# Patient Record
Sex: MALE | Race: WHITE | NOT HISPANIC OR LATINO | Employment: FULL TIME | URBAN - METROPOLITAN AREA
[De-identification: names, ages, dates, MRNs, and addresses within clinical notes are randomized per-mention and may not be internally consistent; named-entity substitution may affect disease eponyms.]

---

## 2023-03-16 LAB
AMPHETAMINE (PRESENCE) IN URINE BY SCREEN METHOD: ABNORMAL
BARBITURATES PRESENCE IN URINE BY SCREEN METHOD: ABNORMAL
BENZODIAZEPINE (PRESENCE) IN URINE BY SCREEN METHOD: ABNORMAL
CANNABINOIDS IN URINE BY SCREEN METHOD: ABNORMAL
COCAINE (PRESENCE) IN URINE BY SCREEN METHOD: ABNORMAL
DRUG SCREEN COMMENT URINE: ABNORMAL
FENTANYL URINE: ABNORMAL
METHADONE (PRESENCE) IN URINE BY SCREEN METHOD: ABNORMAL
OPIATES (PRESENCE) IN URINE BY SCREEN METHOD: ABNORMAL
OXYCODONE (PRESENCE) IN URINE BY SCREEN METHOD: ABNORMAL
PHENCYCLIDINE (PRESENCE) IN URINE BY SCREEN METHOD: ABNORMAL

## 2023-03-18 LAB — PREGABALIN,URINE: 21.5 UG/ML

## 2023-03-20 LAB
6-ACETYLMORPHINE: <25 NG/ML
7-AMINOCLONAZEPAM: <25 NG/ML
ALPHA-HYDROXYALPRAZOLAM: <25 NG/ML
ALPHA-HYDROXYMIDAZOLAM: <25 NG/ML
ALPRAZOLAM: <25 NG/ML
AMPHETAMINE (PRESENCE) IN URINE BY SCREEN METHOD: ABNORMAL
BARBITURATES PRESENCE IN URINE BY SCREEN METHOD: ABNORMAL
CANNABINOIDS IN URINE BY SCREEN METHOD: ABNORMAL
CHLORDIAZEPOXIDE: <25 NG/ML
CLONAZEPAM: <25 NG/ML
COCAINE (PRESENCE) IN URINE BY SCREEN METHOD: ABNORMAL
CODEINE: <50 NG/ML
CREATINE, URINE FOR DRUG: 23.4 MG/DL
DIAZEPAM: <25 NG/ML
DRUG SCREEN COMMENT URINE: ABNORMAL
EDDP: <25 NG/ML
FENTANYL CONFIRMATION, URINE: <2.5 NG/ML
HYDROCODONE: <25 NG/ML
HYDROMORPHONE: <25 NG/ML
LORAZEPAM: <25 NG/ML
METHADONE CONFIRMATION,URINE: <25 NG/ML
MIDAZOLAM: <25 NG/ML
MORPHINE URINE: 413 NG/ML
NORDIAZEPAM: <25 NG/ML
NORFENTANYL: <2.5 NG/ML
NORHYDROCODONE: <25 NG/ML
NOROXYCODONE: <25 NG/ML
O-DESMETHYLTRAMADOL: <50 NG/ML
OXAZEPAM: <25 NG/ML
OXYCODONE: <25 NG/ML
OXYMORPHONE: <25 NG/ML
PHENCYCLIDINE (PRESENCE) IN URINE BY SCREEN METHOD: ABNORMAL
TEMAZEPAM: <25 NG/ML
TRAMADOL: <50 NG/ML
ZOLPIDEM METABOLITE (ZCA): <25 NG/ML
ZOLPIDEM: <25 NG/ML

## 2023-10-18 DIAGNOSIS — G89.4 CHRONIC PAIN SYNDROME: Primary | ICD-10-CM

## 2023-10-18 RX ORDER — MORPHINE SULFATE 15 MG/1
15 TABLET ORAL ONCE
COMMUNITY
End: 2023-10-18 | Stop reason: SDUPTHER

## 2023-10-18 RX ORDER — PREGABALIN 150 MG/1
1 CAPSULE ORAL 2 TIMES DAILY
COMMUNITY
Start: 2015-04-13 | End: 2023-10-18 | Stop reason: SDUPTHER

## 2023-10-20 RX ORDER — MORPHINE SULFATE 15 MG/1
15 TABLET ORAL ONCE AS NEEDED
Qty: 20 TABLET | Refills: 0 | Status: SHIPPED | OUTPATIENT
Start: 2023-10-20 | End: 2023-11-21 | Stop reason: SDUPTHER

## 2023-10-20 RX ORDER — PREGABALIN 150 MG/1
150 CAPSULE ORAL 2 TIMES DAILY
Qty: 60 CAPSULE | Refills: 5 | Status: SHIPPED | OUTPATIENT
Start: 2023-10-20 | End: 2023-11-19

## 2023-11-21 DIAGNOSIS — G89.4 CHRONIC PAIN SYNDROME: ICD-10-CM

## 2023-11-22 RX ORDER — MORPHINE SULFATE 15 MG/1
15 TABLET ORAL ONCE AS NEEDED
Qty: 20 TABLET | Refills: 0 | Status: SHIPPED | OUTPATIENT
Start: 2023-11-22 | End: 2023-12-27 | Stop reason: SDUPTHER

## 2023-12-27 DIAGNOSIS — G89.4 CHRONIC PAIN SYNDROME: ICD-10-CM

## 2023-12-27 PROBLEM — S42.92XA FRACTURE OF LEFT SHOULDER: Status: ACTIVE | Noted: 2023-12-27

## 2023-12-27 PROBLEM — M25.562 LEFT KNEE PAIN: Status: ACTIVE | Noted: 2023-12-27

## 2023-12-27 PROBLEM — E11.9 DIABETES MELLITUS (MULTI): Status: ACTIVE | Noted: 2023-12-27

## 2023-12-27 PROBLEM — M62.838 MUSCLE SPASM OF LEFT SHOULDER: Status: ACTIVE | Noted: 2023-12-27

## 2023-12-27 PROBLEM — S90.01XA CONTUSION OF RIGHT ANKLE: Status: ACTIVE | Noted: 2023-12-27

## 2023-12-27 PROBLEM — K40.90 LEFT INGUINAL HERNIA: Status: ACTIVE | Noted: 2023-12-27

## 2023-12-27 PROBLEM — M17.12 OSTEOARTHRITIS OF LEFT KNEE: Status: ACTIVE | Noted: 2023-12-27

## 2023-12-27 PROBLEM — S90.31XA CONTUSION OF FOOT, RIGHT: Status: ACTIVE | Noted: 2023-12-27

## 2023-12-27 PROBLEM — J31.0 RHINITIS, CHRONIC: Status: ACTIVE | Noted: 2023-12-27

## 2023-12-27 PROBLEM — R14.0 ABDOMINAL BLOATING: Status: ACTIVE | Noted: 2023-12-27

## 2023-12-27 PROBLEM — G90.521 COMPLEX REGIONAL PAIN SYNDROME TYPE 1 OF RIGHT LOWER EXTREMITY: Status: ACTIVE | Noted: 2023-12-27

## 2023-12-27 RX ORDER — INSULIN GLARGINE 300 U/ML
INJECTION, SOLUTION SUBCUTANEOUS
COMMUNITY
Start: 2021-10-15

## 2023-12-27 RX ORDER — PENICILLIN V POTASSIUM 500 MG/1
TABLET, FILM COATED ORAL
COMMUNITY
Start: 2023-11-17 | End: 2024-02-21 | Stop reason: ALTCHOICE

## 2023-12-27 RX ORDER — ATORVASTATIN CALCIUM 20 MG/1
20 TABLET, FILM COATED ORAL
COMMUNITY
Start: 2023-08-09

## 2023-12-27 RX ORDER — INSULIN ASPART INJECTION 100 [IU]/ML
INJECTION, SOLUTION SUBCUTANEOUS
COMMUNITY
Start: 2019-04-11

## 2023-12-27 RX ORDER — CHOLECALCIFEROL (VITAMIN D3) 25 MCG
1 TABLET ORAL DAILY
COMMUNITY
Start: 2021-06-11

## 2023-12-27 RX ORDER — FLUTICASONE PROPIONATE 93 UG/1
SPRAY, METERED NASAL
COMMUNITY
Start: 2023-04-18

## 2023-12-27 RX ORDER — INSULIN GLARGINE 100 [IU]/ML
INJECTION, SOLUTION SUBCUTANEOUS
COMMUNITY
Start: 2021-10-15

## 2023-12-27 RX ORDER — IBUPROFEN 200 MG
TABLET ORAL
COMMUNITY
Start: 2023-05-10

## 2023-12-27 RX ORDER — DULOXETIN HYDROCHLORIDE 60 MG/1
1 CAPSULE, DELAYED RELEASE ORAL NIGHTLY
COMMUNITY
Start: 2017-03-13

## 2023-12-27 RX ORDER — GLUCAGON 3 MG/1
POWDER NASAL
COMMUNITY
Start: 2021-07-12

## 2023-12-27 RX ORDER — INSULIN GLARGINE 100 [IU]/ML
INJECTION, SOLUTION SUBCUTANEOUS
COMMUNITY
Start: 2019-04-11

## 2023-12-27 RX ORDER — NALOXONE HYDROCHLORIDE 4 MG/.1ML
SPRAY NASAL
COMMUNITY
Start: 2021-09-08

## 2023-12-27 RX ORDER — ESZOPICLONE 3 MG/1
1 TABLET, FILM COATED ORAL NIGHTLY
COMMUNITY
Start: 2014-06-11 | End: 2024-02-21 | Stop reason: ALTCHOICE

## 2023-12-27 RX ORDER — IBUPROFEN 600 MG/1
TABLET ORAL
COMMUNITY
Start: 2023-11-17

## 2023-12-27 RX ORDER — AZELASTINE HCL 205.5 UG/1
SPRAY NASAL
COMMUNITY
Start: 2020-08-20

## 2023-12-27 RX ORDER — FLASH GLUCOSE SENSOR
KIT MISCELLANEOUS
COMMUNITY
Start: 2023-09-26

## 2023-12-27 RX ORDER — MORPHINE SULFATE 15 MG/1
15 TABLET ORAL ONCE AS NEEDED
Qty: 20 TABLET | Refills: 0 | Status: SHIPPED | OUTPATIENT
Start: 2023-12-27 | End: 2024-01-29 | Stop reason: SDUPTHER

## 2024-01-08 ENCOUNTER — APPOINTMENT (OUTPATIENT)
Dept: PAIN MEDICINE | Facility: CLINIC | Age: 54
End: 2024-01-08
Payer: COMMERCIAL

## 2024-01-29 DIAGNOSIS — G89.4 CHRONIC PAIN SYNDROME: ICD-10-CM

## 2024-01-30 RX ORDER — MORPHINE SULFATE 15 MG/1
15 TABLET ORAL ONCE AS NEEDED
Qty: 20 TABLET | Refills: 0 | Status: SHIPPED | OUTPATIENT
Start: 2024-01-30 | End: 2024-02-21 | Stop reason: SDUPTHER

## 2024-02-21 ENCOUNTER — OFFICE VISIT (OUTPATIENT)
Dept: PAIN MEDICINE | Facility: CLINIC | Age: 54
End: 2024-02-21
Payer: COMMERCIAL

## 2024-02-21 VITALS
WEIGHT: 233 LBS | HEIGHT: 75 IN | RESPIRATION RATE: 15 BRPM | BODY MASS INDEX: 28.97 KG/M2 | DIASTOLIC BLOOD PRESSURE: 95 MMHG | HEART RATE: 75 BPM | SYSTOLIC BLOOD PRESSURE: 140 MMHG | TEMPERATURE: 96.6 F

## 2024-02-21 DIAGNOSIS — G89.4 CHRONIC PAIN SYNDROME: ICD-10-CM

## 2024-02-21 PROCEDURE — 99214 OFFICE O/P EST MOD 30 MIN: CPT | Performed by: ANESTHESIOLOGY

## 2024-02-21 RX ORDER — MORPHINE SULFATE 15 MG/1
15 TABLET ORAL ONCE AS NEEDED
Qty: 20 TABLET | Refills: 0 | Status: SHIPPED | OUTPATIENT
Start: 2024-03-04 | End: 2024-04-04 | Stop reason: SDUPTHER

## 2024-02-21 ASSESSMENT — COLUMBIA-SUICIDE SEVERITY RATING SCALE - C-SSRS
6. HAVE YOU EVER DONE ANYTHING, STARTED TO DO ANYTHING, OR PREPARED TO DO ANYTHING TO END YOUR LIFE?: NO
2. HAVE YOU ACTUALLY HAD ANY THOUGHTS OF KILLING YOURSELF?: NO
1. IN THE PAST MONTH, HAVE YOU WISHED YOU WERE DEAD OR WISHED YOU COULD GO TO SLEEP AND NOT WAKE UP?: NO

## 2024-02-21 ASSESSMENT — ENCOUNTER SYMPTOMS
LOSS OF SENSATION IN FEET: 0
OCCASIONAL FEELINGS OF UNSTEADINESS: 0
DEPRESSION: 0

## 2024-02-21 ASSESSMENT — PATIENT HEALTH QUESTIONNAIRE - PHQ9
2. FEELING DOWN, DEPRESSED OR HOPELESS: NOT AT ALL
1. LITTLE INTEREST OR PLEASURE IN DOING THINGS: NOT AT ALL
SUM OF ALL RESPONSES TO PHQ9 QUESTIONS 1 AND 2: 0

## 2024-02-21 ASSESSMENT — LIFESTYLE VARIABLES: TOTAL SCORE: 0

## 2024-02-21 ASSESSMENT — PAIN SCALES - GENERAL: PAINLEVEL: 8

## 2024-02-21 NOTE — PROGRESS NOTES
History Of Present Illness  Benito Hare is a 53 y.o. male presenting with   Chief Complaint   Patient presents with    Pain     Patient follows up for his chronic right foot pain FROM his work related injury he is on [facility]. He reports he injured his right shoulder after a near fall approx 1 year ago and was dx with rotator cuff repair.      He also reports pain into his LEFT knee continues to bother him and he has an appt with [facility].     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 The pt underwent over 12 lumbar sympathetic blocks to treat his right foot pain. The pain is constant, worse with activity and better with rest. The pain is an aching like sensation that can intensify with activity. Denies LE paresthesias, weakness, saddle anesthesia, bowel or bladder incontinence. To manage this pain the patient has attempted Lyrica 150mg BID. The patients chronic IDDM are stable.      Recall:   He had an episode of hypoglycemia on 9-1-17 and passed out while on the couch. He did not have any head or shoulder trauma. EMS came to the house and tried to remove his jacket and in the process he broke his left shoulder and now s/p shoulder surgery.      Pt reports right knee pain for several months. He ambulates with a noticeable limp due to CRPS in his right foot. I believe that he may have knee OA or ligament injury and will order an x-ray to confirm OA. I believe this injury is a direct result of his [facility] injury of CRPS in the right foot.      PAIN SCORE: 4/10 in his foot and 8/10 in his left knee     PSHx     Pt is s/p Right sided hernia repair on July 7th, 2021.   He is s/p nasal surgery on 5-6-2021 that was complicated by post operative bleeding on 5- and had a cauterization surgery on 5-. He reports his nose is sensitive to touch and cold / dry air. His ENT is Dr. Nabil Spencer     Past Medical  History  He has a past medical history of Chronic pain syndrome (05/08/2017), Complex regional pain syndrome i of unspecified lower limb (05/08/2017), Contusion of unspecified ankle, initial encounter, Contusion of unspecified foot, initial encounter (05/08/2017), Other long term (current) drug therapy (10/06/2016), Other specified health status, and Rotator cuff arthropathy, right.    Surgical History  He has a past surgical history that includes Other surgical history (05/05/2016); Other surgical history (09/28/2021); Other surgical history (09/28/2021); and Shoulder surgery (01/17/2018).     Social History  He reports that he has never smoked. He has never used smokeless tobacco. He reports current alcohol use. He reports that he does not use drugs.    Family History  No family history on file.     Allergies  Patient has no known allergies.    Review of Systems    All other systems reviewed and negative for any deficits. Pertinent positives and negatives were considered in the medical decision making process.        Physical Exam  BP (!) 140/95   Pulse 75   Temp 35.9 °C (96.6 °F)   Resp 15   Wt 106 kg (233 lb)     General: Pt appears stated age     Eyes: Conjunctiva non-icteric and lids without obvious rash or drooping. Pupils are symmetric     ENT: External ears and nose appear to be without deformity or rash. No lesions or masses noted. Hearing is grossly intact     Neck: No JVD noted, tracheal position midline.     Skin: Color change red and blue in his right foot with edema and changes in the nail beds.      Musculoskeletal: Gait is antalgic. Walks on his right heel      Digits/nails show no clubbing or cyanosis     Exam of muscles/joints/bones shows no gross atrophy and no abnormal/involuntary movements in the head/neckNo asymmetry or masses noted in the head/neck     Stability: no subluxation noted on movement of bilateral upper extremities or head/neck     Strength: 4/5 in B/L Lower extremities       Range of Motion: Decreased in right ankle and decreased in left shoulder      Sensation: In tact      Cranial nerves 2-12 are grossly intact     Psychiatric: Pt is alert and oriented to time, place and person.      Assessment/Plan   No diagnosis found.     [facility]     S90.31XA  S90.01XA  G90.521        1. We once again did discuss the risks, benefits, and alternatives to chronic opioid therapy and that usage can be a danger to life. We also discussed proper and safe storage of medication to prevent unauthorized usage. The patient understands and will use the medicine responsibly. I will continue to wean his morphine and we discussed.      He is currently on Morphine 15mg once a day without any side effects.   Recall: his insurance company would not authorize an extended release form.      He brought in his previous months vial and his usage has been appropriate. He also reports he has been able to take 1/2 a tablet at times to help wit his nerve pain.      Pt provided a urine screen on 3-14-19 and it was appropriate for morphine and negative for any illicit drugs.      We did check an oral screen on 7-2-2020 and it was appropriate.      UDS on 6-9-2021 was appropriate and collected on 5-, 3-  CSA completed 3- and again on 12-  Lyrica contract on 11- and again on 12-  Oarrs checked on 11-, 5-, 12-, 9-  Narcan Prescribed in 2021, 9-  ORT completed on 11- score was 0.      Pt was also given a prescription for Narcan with his last prescription. We extensively discussed how to use this and what it is for as well as the side effects.      --Will temporarily increase Mrophine dosage to 20 tabs per month due to recent Right rotator cuff tear.      2. The patient is a candidate for a repeat right lumbar sympathetic block as needed. The patient understands the risks and will schedule if his condition flares up.      3. I will continuing the pt  on Lyrica 150mg BID to treat nerve related pain. We discussed the risks and side effects in detail he reports he is doing well with this medication.      He reports feeling fuzzy with this medication and will discontinue his AM dosing.      Pt signed a Lyrica contract on 11- and again on 12-. It was reviewed line by line.      4. We once again discussed the risks with having additional surgery on the extremities and how CRPS can spread to other extremities. The pt would need to have a nerve block prior to any extremity surgery procedure. The pt should also avoid IV's and blood pressure readings in the affected extremity.     5. I would recommend the pt continue on Cymbalta 60mg to help with nerve related pain. We discussed the risks, benefits, and side effects to this medication including the mechanism of action and the pt understands and agrees.     6. Pt reports right knee pain for several months. He ambulates with a noticeable limp due to CRPS in his right foot. I believe that he may have knee OA or ligament injury and will order an x-ray to confirm OA. I believe this injury is a direct result of his [facility] injury of CRPS in the right foot. He has a follow with Dr. Martínez an orthopedic surgeon at [facility] regarding his MRI findings.      7. The pt previously requested a renewal for his APEX shoe he obtained thru [facility]. I will contact our staff to assist in reordering. He reports the last time he had the shoe made was over 10 years ago. Our staff Har[place]reet in  was able to help him procure this.      f/u in 3 months      I spent time with the patient reviewing their imaging and discussing the risks benefits and alternatives to the above plan. A total of 30 minutes was spent reviewing the data and greater than 50% of that time was with the patient during the face to face encounter discussing treatment options both surgical, non-surgical, and minimally invasive techniques.     DATE OF PROCEDURE:  12-     PRE-PROCEDURE DIAGNOSIS: LEFT knee osteoarthritis     1. LEFT knee joint DURALANE          Giuliano Galindo MD    I spent time with the patient reviewing their imaging and discussing the risks benefits and alternatives to the above plan. A total of 30 minutes was spent reviewing the data and greater than 50% of that time was with the patient during the face to face encounter discussing treatment options both surgical, non-surgical, and minimally invasive techniques.

## 2024-04-04 DIAGNOSIS — G89.4 CHRONIC PAIN SYNDROME: ICD-10-CM

## 2024-04-04 RX ORDER — MORPHINE SULFATE 15 MG/1
15 TABLET ORAL ONCE AS NEEDED
Qty: 20 TABLET | Refills: 0 | Status: SHIPPED | OUTPATIENT
Start: 2024-04-04 | End: 2024-05-04

## 2024-05-13 ENCOUNTER — TELEPHONE (OUTPATIENT)
Dept: PAIN MEDICINE | Facility: CLINIC | Age: 54
End: 2024-05-13
Payer: COMMERCIAL

## 2024-05-13 DIAGNOSIS — G90.521 COMPLEX REGIONAL PAIN SYNDROME TYPE 1 OF RIGHT LOWER EXTREMITY: Primary | ICD-10-CM

## 2024-05-13 RX ORDER — MORPHINE SULFATE 15 MG/1
15 TABLET ORAL EVERY 4 HOURS PRN
Qty: 20 TABLET | Refills: 0 | Status: SHIPPED | OUTPATIENT
Start: 2024-05-13 | End: 2024-06-05 | Stop reason: SDUPTHER

## 2024-06-05 ENCOUNTER — OFFICE VISIT (OUTPATIENT)
Dept: PAIN MEDICINE | Facility: CLINIC | Age: 54
End: 2024-06-05
Payer: COMMERCIAL

## 2024-06-05 VITALS
SYSTOLIC BLOOD PRESSURE: 164 MMHG | OXYGEN SATURATION: 97 % | RESPIRATION RATE: 15 BRPM | DIASTOLIC BLOOD PRESSURE: 94 MMHG | HEART RATE: 104 BPM | BODY MASS INDEX: 29.12 KG/M2 | TEMPERATURE: 98.6 F | WEIGHT: 233 LBS

## 2024-06-05 DIAGNOSIS — G90.521 COMPLEX REGIONAL PAIN SYNDROME TYPE 1 OF RIGHT LOWER EXTREMITY: ICD-10-CM

## 2024-06-05 PROCEDURE — 99214 OFFICE O/P EST MOD 30 MIN: CPT | Performed by: ANESTHESIOLOGY

## 2024-06-05 RX ORDER — MORPHINE SULFATE 15 MG/1
15 TABLET ORAL EVERY 4 HOURS PRN
Qty: 20 TABLET | Refills: 0 | Status: SHIPPED | OUTPATIENT
Start: 2024-06-12 | End: 2024-07-02

## 2024-06-05 ASSESSMENT — ENCOUNTER SYMPTOMS
OCCASIONAL FEELINGS OF UNSTEADINESS: 0
LOSS OF SENSATION IN FEET: 0

## 2024-06-05 ASSESSMENT — PAIN - FUNCTIONAL ASSESSMENT: PAIN_FUNCTIONAL_ASSESSMENT: 0-10

## 2024-06-05 ASSESSMENT — PAIN SCALES - GENERAL
PAINLEVEL_OUTOF10: 3
PAINLEVEL: 3

## 2024-06-05 ASSESSMENT — PAIN DESCRIPTION - DESCRIPTORS: DESCRIPTORS: SHARP

## 2024-06-05 NOTE — PROGRESS NOTES
History Of Present Illness  Benito Hare is a 53 y.o. male presenting with   Chief Complaint   Patient presents with    Follow-up     Patient follows up for his chronic right foot pain due to a work related [facility] injury.       He also reports pain into his LEFT knee continues to bother him and he has an appt with [facility].     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 The pt underwent over 12 lumbar sympathetic blocks to treat his right foot pain. The pain is constant, worse with activity and better with rest. The pain is an aching like sensation that can intensify with activity. Denies LE paresthesias, weakness, saddle anesthesia, bowel or bladder incontinence. To manage this pain the patient has attempted Lyrica 150mg BID. The patients chronic IDDM are stable.      Recall:   He had an episode of hypoglycemia on 9-1-17 and passed out while on the couch. He did not have any head or shoulder trauma. EMS came to the house and tried to remove his jacket and in the process he broke his left shoulder and now s/p shoulder surgery.      Pt reports right knee pain for several months. He ambulates with a noticeable limp due to CRPS in his right foot. I believe that he may have knee OA or ligament injury and will order an x-ray to confirm OA. I believe this injury is a direct result of his [facility] injury of CRPS in the right foot.      PAIN SCORE: 3/10 in his foot and 8/10 in his left knee     PSHx     Pt is s/p Right sided hernia repair on July 7th, 2021.   He is s/p nasal surgery on 5-6-2021 that was complicated by post operative bleeding on 5- and had a cauterization surgery on 5-. He reports his nose is sensitive to touch and cold / dry air. His ENT is Dr. Nabil Spencer     Past Medical History  He has a past medical history of Chronic pain syndrome (05/08/2017), Complex regional pain syndrome i of  unspecified lower limb (05/08/2017), Contusion of unspecified ankle, initial encounter, Contusion of unspecified foot, initial encounter (05/08/2017), Other long term (current) drug therapy (10/06/2016), Other specified health status, and Rotator cuff arthropathy, right.    Surgical History  He has a past surgical history that includes Other surgical history (05/05/2016); Other surgical history (09/28/2021); Other surgical history (09/28/2021); and Shoulder surgery (01/17/2018).     Social History  He reports that he has never smoked. He has never used smokeless tobacco. He reports current alcohol use. He reports that he does not use drugs.    Family History  No family history on file.     Allergies  Patient has no known allergies.    Review of Systems    All other systems reviewed and negative for any deficits. Pertinent positives and negatives were considered in the medical decision making process.        Physical Exam  BP (!) 164/94   Pulse 104   Temp 37 °C (98.6 °F)   Resp 15   Wt 106 kg (233 lb)   SpO2 97%     General: Pt appears stated age     Eyes: Conjunctiva non-icteric and lids without obvious rash or drooping. Pupils are symmetric     ENT: External ears and nose appear to be without deformity or rash. No lesions or masses noted. Hearing is grossly intact     Neck: No JVD noted, tracheal position midline.     Skin: Color change red and blue in his right foot with edema and changes in the nail beds.      Musculoskeletal: Gait is antalgic. Walks on his right heel      Digits/nails show no clubbing or cyanosis     Exam of muscles/joints/bones shows no gross atrophy and no abnormal/involuntary movements in the head/neckNo asymmetry or masses noted in the head/neck     Stability: no subluxation noted on movement of bilateral upper extremities or head/neck     Strength: 4/5 in B/L Lower extremities      Range of Motion: Decreased in right ankle and decreased in left shoulder      Sensation: In tact       Cranial nerves 2-12 are grossly intact     Psychiatric: Pt is alert and oriented to time, place and person.      Assessment/Plan   No diagnosis found.     [facility]     S90.31XA  S90.01XA  G90.521        1. We once again did discuss the risks, benefits, and alternatives to chronic opioid therapy and that usage can be a danger to life. We also discussed proper and safe storage of medication to prevent unauthorized usage. The patient understands and will use the medicine responsibly. I will continue to wean his morphine and we discussed.      He is currently on Morphine 15mg once a day without any side effects.   Recall: his insurance company would not authorize an extended release form.      He brought in his previous months vial and his usage has been appropriate. He also reports he has been able to take 1/2 a tablet at times to help wit his nerve pain.      Pt provided a urine screen on 3-14-19 and it was appropriate for morphine and negative for any illicit drugs.      We did check an oral screen on 7-2-2020 and it was appropriate.      UDS on 6-9-2021 was appropriate and collected on 5-, 3-  CSA completed 3- and again on 12-, 6-5-2024  Lyrica contract on 11- and again on 12-  Oarrs checked on 11-, 5-, 12-, 9-  Narcan Prescribed in 2021, 9-  ORT completed on 11- score was 0.      Pt was also given a prescription for Narcan with his last prescription. We extensively discussed how to use this and what it is for as well as the side effects.      --Will temporarily increase Mrophine dosage to 20 tabs per month due to recent Right rotator cuff tear.      2. The patient is a candidate for a repeat right lumbar sympathetic block as needed. The patient understands the risks and will schedule if his condition flares up.      3. I will continuing the pt on Lyrica 150mg BID to treat nerve related pain. We discussed the risks and side effects in  detail he reports he is doing well with this medication.      He reports feeling fuzzy with this medication and is weaning off of this medication currently. .      Pt previously signed a Lyrica contract on 11- and again on 12-. It was reviewed line by line.      4. We once again discussed the risks with having additional surgery on the extremities and how CRPS can spread to other extremities. The pt would need to have a nerve block prior to any extremity surgery procedure. The pt should also avoid IV's and blood pressure readings in the affected extremity.     5. I would recommend the pt continue on Cymbalta 60mg to help with nerve related pain. We discussed the risks, benefits, and side effects to this medication including the mechanism of action and the pt understands and agrees.     6. Pt reports right knee pain for several months. He ambulates with a noticeable limp due to CRPS in his right foot. I believe that he may have knee OA or ligament injury and will order an x-ray to confirm OA. I believe this injury is a direct result of his [facility] injury of CRPS in the right foot. He has a follow with Dr. Martínez an orthopedic surgeon at [facility] regarding his MRI findings.      7. The pt previously requested a renewal for his APEX shoe he obtained thru [facility]. I will contact our staff to assist in reordering. He reports the last time he had the shoe made was over 10 years ago. Our staff [place] in  was able to help him procure this.      f/u in 3 months      I spent time with the patient reviewing their imaging and discussing the risks benefits and alternatives to the above plan. A total of 30 minutes was spent reviewing the data and greater than 50% of that time was with the patient during the face to face encounter discussing treatment options both surgical, non-surgical, and minimally invasive techniques.     DATE OF PROCEDURE: 12-     PRE-PROCEDURE DIAGNOSIS: LEFT knee osteoarthritis     1.  LEFT knee joint DURALANE          Giuliano Galindo MD    I spent time with the patient reviewing their imaging and discussing the risks benefits and alternatives to the above plan. A total of 30 minutes was spent reviewing the data and greater than 50% of that time was with the patient during the face to face encounter discussing treatment options both surgical, non-surgical, and minimally invasive techniques.

## 2024-07-25 ENCOUNTER — TELEPHONE (OUTPATIENT)
Dept: PAIN MEDICINE | Facility: CLINIC | Age: 54
End: 2024-07-25
Payer: COMMERCIAL

## 2024-07-25 DIAGNOSIS — G90.521 COMPLEX REGIONAL PAIN SYNDROME TYPE 1 OF RIGHT LOWER EXTREMITY: Primary | ICD-10-CM

## 2024-07-25 RX ORDER — MORPHINE SULFATE 15 MG/1
15 TABLET ORAL DAILY PRN
Qty: 20 TABLET | Refills: 0 | Status: SHIPPED | OUTPATIENT
Start: 2024-07-25 | End: 2024-08-24

## 2024-08-28 DIAGNOSIS — G90.521 COMPLEX REGIONAL PAIN SYNDROME TYPE 1 OF RIGHT LOWER EXTREMITY: ICD-10-CM

## 2024-08-30 RX ORDER — MORPHINE SULFATE 15 MG/1
15 TABLET ORAL DAILY PRN
Qty: 20 TABLET | Refills: 0 | Status: SHIPPED | OUTPATIENT
Start: 2024-08-30 | End: 2024-09-29

## 2024-09-04 ENCOUNTER — OFFICE VISIT (OUTPATIENT)
Dept: PAIN MEDICINE | Facility: CLINIC | Age: 54
End: 2024-09-04
Payer: COMMERCIAL

## 2024-09-04 VITALS
WEIGHT: 230 LBS | HEIGHT: 75 IN | HEART RATE: 105 BPM | DIASTOLIC BLOOD PRESSURE: 89 MMHG | TEMPERATURE: 99.9 F | SYSTOLIC BLOOD PRESSURE: 142 MMHG | BODY MASS INDEX: 28.6 KG/M2 | RESPIRATION RATE: 18 BRPM | OXYGEN SATURATION: 96 %

## 2024-09-04 DIAGNOSIS — G90.521 REFLEX SYMPATHETIC DYSTROPHY OF RIGHT LEG: ICD-10-CM

## 2024-09-04 DIAGNOSIS — S90.31XA CONTUSION OF RIGHT FOOT, INITIAL ENCOUNTER: ICD-10-CM

## 2024-09-04 DIAGNOSIS — Z79.891 LONG TERM (CURRENT) USE OF OPIATE ANALGESIC: Primary | ICD-10-CM

## 2024-09-04 DIAGNOSIS — G90.521 COMPLEX REGIONAL PAIN SYNDROME TYPE 1 OF RIGHT LOWER EXTREMITY: ICD-10-CM

## 2024-09-04 DIAGNOSIS — S90.01XA CONTUSION OF RIGHT ANKLE, INITIAL ENCOUNTER: ICD-10-CM

## 2024-09-04 LAB
AMPHETAMINES UR QL SCN: NORMAL
BARBITURATES UR QL SCN: NORMAL
BZE UR QL SCN: NORMAL
CANNABINOIDS UR QL SCN: NORMAL
CREAT UR-MCNC: 64.6 MG/DL (ref 20–370)
PCP UR QL SCN: NORMAL

## 2024-09-04 PROCEDURE — 99214 OFFICE O/P EST MOD 30 MIN: CPT | Performed by: ANESTHESIOLOGY

## 2024-09-04 PROCEDURE — 80307 DRUG TEST PRSMV CHEM ANLYZR: CPT | Performed by: ANESTHESIOLOGY

## 2024-09-04 RX ORDER — MORPHINE SULFATE 15 MG/1
15 TABLET ORAL DAILY PRN
Qty: 20 TABLET | Refills: 0 | Status: SHIPPED | OUTPATIENT
Start: 2024-09-29 | End: 2024-10-29

## 2024-09-04 ASSESSMENT — COLUMBIA-SUICIDE SEVERITY RATING SCALE - C-SSRS
2. HAVE YOU ACTUALLY HAD ANY THOUGHTS OF KILLING YOURSELF?: NO
1. IN THE PAST MONTH, HAVE YOU WISHED YOU WERE DEAD OR WISHED YOU COULD GO TO SLEEP AND NOT WAKE UP?: NO
6. HAVE YOU EVER DONE ANYTHING, STARTED TO DO ANYTHING, OR PREPARED TO DO ANYTHING TO END YOUR LIFE?: NO

## 2024-09-04 ASSESSMENT — ENCOUNTER SYMPTOMS
DEPRESSION: 0
OCCASIONAL FEELINGS OF UNSTEADINESS: 0
LOSS OF SENSATION IN FEET: 0

## 2024-09-04 ASSESSMENT — PAIN SCALES - GENERAL
PAINLEVEL_OUTOF10: 4
PAINLEVEL: 4

## 2024-09-04 ASSESSMENT — PAIN DESCRIPTION - DESCRIPTORS: DESCRIPTORS: ACHING

## 2024-09-04 ASSESSMENT — PAIN - FUNCTIONAL ASSESSMENT: PAIN_FUNCTIONAL_ASSESSMENT: 0-10

## 2024-09-04 NOTE — PROGRESS NOTES
History Of Present Illness  Benito Hare is a 53 y.o. male presenting with   Chief Complaint   Patient presents with    Follow-up     Medication follow up- right foot pain 5/10, Morphine 15 mg provides 50% relief.      Patient follows up for his chronic right foot pain due to a work related [employer] injury.       He also reports pain into his LEFT knee continues to bother him and he has an appt with [facility].     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 The pt underwent over 12 lumbar sympathetic blocks to treat his right foot pain. The pain is constant, worse with activity and better with rest. The pain is an aching like sensation that can intensify with activity. Denies LE paresthesias, weakness, saddle anesthesia, bowel or bladder incontinence. To manage this pain the patient has attempted Lyrica 150mg BID. The patients chronic IDDM are stable.      Recall:   He had an episode of hypoglycemia on 9-1-17 and passed out while on the couch. He did not have any head or shoulder trauma. EMS came to the house and tried to remove his jacket and in the process he broke his left shoulder and now s/p shoulder surgery.      Pt reports right knee pain for several months. He ambulates with a noticeable limp due to CRPS in his right foot. I believe that he may have knee OA or ligament injury and will order an x-ray to confirm OA. I believe this injury is a direct result of his [employer] injury of CRPS in the right foot.      PAIN SCORE: 5/10 in his foot and 8/10 in his left knee     PSHx     Pt is s/p Right sided hernia repair on July 7th, 2021.   He is s/p nasal surgery on 5-6-2021 that was complicated by post operative bleeding on 5- and had a cauterization surgery on 5-. He reports his nose is sensitive to touch and cold / dry air. His ENT is Dr. Nabil Spencer     Past Medical History  He has a past medical  history of Chronic pain syndrome (05/08/2017), Complex regional pain syndrome i of unspecified lower limb (05/08/2017), Contusion of unspecified ankle, initial encounter, Contusion of unspecified foot, initial encounter (05/08/2017), Other long term (current) drug therapy (10/06/2016), Other specified health status, and Rotator cuff arthropathy, right.    Surgical History  He has a past surgical history that includes Other surgical history (05/05/2016); Other surgical history (09/28/2021); Other surgical history (09/28/2021); and Shoulder surgery (01/17/2018).     Social History  He reports that he has never smoked. He has never used smokeless tobacco. He reports current alcohol use. He reports that he does not use drugs.    Family History  No family history on file.     Allergies  Patient has no known allergies.    Review of Systems    All other systems reviewed and negative for any deficits. Pertinent positives and negatives were considered in the medical decision making process.        Physical Exam  /89   Pulse 105   Temp 37.7 °C (99.9 °F)   Resp 18   Wt 104 kg (230 lb)   SpO2 96%     General: Pt appears stated age     Eyes: Conjunctiva non-icteric and lids without obvious rash or drooping. Pupils are symmetric     ENT: External ears and nose appear to be without deformity or rash. No lesions or masses noted. Hearing is grossly intact     Neck: No JVD noted, tracheal position midline.     Skin: Color change red and blue in his right foot with edema and changes in the nail beds.      Musculoskeletal: Gait is antalgic. Walks on his right heel      Digits/nails show no clubbing or cyanosis     Exam of muscles/joints/bones shows no gross atrophy and no abnormal/involuntary movements in the head/neckNo asymmetry or masses noted in the head/neck     Stability: no subluxation noted on movement of bilateral upper extremities or head/neck     Strength: 4/5 in B/L Lower extremities      Range of Motion:  Decreased in right ankle and decreased in left shoulder      Sensation: In tact      Cranial nerves 2-12 are grossly intact     Psychiatric: Pt is alert and oriented to time, place and person.      Assessment/Plan   1. Long term (current) use of opiate analgesic  Opiate/Opioid/Benzo Prescription Compliance      2. Contusion of right ankle, initial encounter        3. Contusion of right foot, initial encounter        4. Reflex sympathetic dystrophy of right leg             [facility]     S90.31XA  S90.01XA  G90.521        1. We once again did discuss the risks, benefits, and alternatives to chronic opioid therapy and that usage can be a danger to life. We also discussed proper and safe storage of medication to prevent unauthorized usage. The patient understands and will use the medicine responsibly. I will continue to wean his morphine and we discussed.      He is currently on Morphine 15mg once a day without any side effects.   Recall: his insurance company would not authorize an extended release form.      He brought in his previous months vial and his usage has been appropriate. He also reports he has been able to take 1/2 a tablet at times to help wit his nerve pain.      Pt provided a urine screen on 3-14-19 and it was appropriate for morphine and negative for any illicit drugs.      We did check an oral screen on 7-2-2020 and it was appropriate.      UDS on 6-9-2021 was appropriate and collected on 5-, 3-, 9-4-2024  CSA completed 3- and again on 12-, 6-5-2024  Lyrica contract on 11- and again on 12-  Oarrs checked on 11-, 5-, 12-, 9-  Narcan Prescribed in 2021, 9-  ORT completed on 11- score was 0.      Pt was also given a prescription for Narcan with his last prescription. We extensively discussed how to use this and what it is for as well as the side effects.      2. The patient is a candidate for a repeat right lumbar sympathetic  block as needed. The patient understands the risks and will schedule if his condition flares up.      3. I will continuing the pt on Lyrica 150mg BID to treat nerve related pain. We discussed the risks and side effects in detail he reports he is doing well with this medication.      He reports feeling fuzzy with this medication and is weaning off of this medication currently. .      Pt previously signed a Lyrica contract on 11- and again on 12-. It was reviewed line by line.      4. We once again discussed the risks with having additional surgery on the extremities and how CRPS can spread to other extremities. The pt would need to have a nerve block prior to any extremity surgery procedure. The pt should also avoid IV's and blood pressure readings in the affected extremity.     5. I would recommend the pt continue on Cymbalta 60mg to help with nerve related pain. We discussed the risks, benefits, and side effects to this medication including the mechanism of action and the pt understands and agrees.     6.    Recall:  Pt reports right knee pain for several months. He ambulates with a noticeable limp due to CRPS in his right foot. I believe that he may have knee OA or ligament injury and will order an x-ray to confirm OA. I believe this injury is a direct result of his [facility] injury of CRPS in the right foot. He has a follow with Dr. Martínez an orthopedic surgeon at [facility] regarding his MRI findings.      7. The pt previously requested a renewal for his APEX shoe he obtained thru [facility]. I will contact our staff to assist in reordering. He reports the last time he had the shoe made was over 10 years ago. Our staff Harpreet in [place] was able to help him procure this.      f/u in 3 months      I spent time with the patient reviewing their imaging and discussing the risks benefits and alternatives to the above plan. A total of 30 minutes was spent reviewing the data and greater than 50% of that time was with  the patient during the face to face encounter discussing treatment options both surgical, non-surgical, and minimally invasive techniques.     DATE OF PROCEDURE: 12-     PRE-PROCEDURE DIAGNOSIS: LEFT knee osteoarthritis     1. LEFT knee joint DURALANE          Giuliano Galindo MD    I spent time with the patient reviewing their imaging and discussing the risks benefits and alternatives to the above plan. A total of 30 minutes was spent reviewing the data and greater than 50% of that time was with the patient during the face to face encounter discussing treatment options both surgical, non-surgical, and minimally invasive techniques.

## 2024-09-10 LAB
1OH-MIDAZOLAM UR CFM-MCNC: <25 NG/ML
6MAM UR CFM-MCNC: <25 NG/ML
7AMINOCLONAZEPAM UR CFM-MCNC: <25 NG/ML
A-OH ALPRAZ UR CFM-MCNC: <25 NG/ML
ALPRAZ UR CFM-MCNC: <25 NG/ML
CHLORDIAZEP UR CFM-MCNC: <25 NG/ML
CLONAZEPAM UR CFM-MCNC: <25 NG/ML
CODEINE UR CFM-MCNC: <50 NG/ML
DIAZEPAM UR CFM-MCNC: <25 NG/ML
EDDP UR CFM-MCNC: <25 NG/ML
FENTANYL UR CFM-MCNC: <2.5 NG/ML
HYDROCODONE CTO UR CFM-MCNC: <25 NG/ML
HYDROMORPHONE UR CFM-MCNC: <25 NG/ML
LORAZEPAM UR CFM-MCNC: <25 NG/ML
METHADONE UR CFM-MCNC: <25 NG/ML
MIDAZOLAM UR CFM-MCNC: <25 NG/ML
MORPHINE UR CFM-MCNC: >2500 NG/ML
NORDIAZEPAM UR CFM-MCNC: <25 NG/ML
NORFENTANYL UR CFM-MCNC: <2.5 NG/ML
NORHYDROCODONE UR CFM-MCNC: <25 NG/ML
NOROXYCODONE UR CFM-MCNC: <25 NG/ML
NORTRAMADOL UR-MCNC: <50 NG/ML
OXAZEPAM UR CFM-MCNC: <25 NG/ML
OXYCODONE UR CFM-MCNC: <25 NG/ML
OXYMORPHONE UR CFM-MCNC: <25 NG/ML
TEMAZEPAM UR CFM-MCNC: <25 NG/ML
TRAMADOL UR CFM-MCNC: <50 NG/ML
ZOLPIDEM UR CFM-MCNC: <25 NG/ML
ZOLPIDEM UR-MCNC: <25 NG/ML

## 2024-11-12 ENCOUNTER — TELEPHONE (OUTPATIENT)
Dept: PAIN MEDICINE | Facility: CLINIC | Age: 54
End: 2024-11-12
Payer: COMMERCIAL

## 2024-11-12 DIAGNOSIS — S90.31XA CONTUSION OF RIGHT FOOT, INITIAL ENCOUNTER: Primary | ICD-10-CM

## 2024-11-13 RX ORDER — MORPHINE SULFATE 15 MG/1
15 TABLET ORAL DAILY PRN
Qty: 20 TABLET | Refills: 0 | Status: SHIPPED | OUTPATIENT
Start: 2024-11-13 | End: 2024-12-03

## 2024-12-04 ENCOUNTER — OFFICE VISIT (OUTPATIENT)
Dept: PAIN MEDICINE | Facility: CLINIC | Age: 54
End: 2024-12-04
Payer: COMMERCIAL

## 2024-12-04 DIAGNOSIS — S90.31XA CONTUSION OF RIGHT FOOT, INITIAL ENCOUNTER: ICD-10-CM

## 2024-12-04 PROCEDURE — 99211 OFF/OP EST MAY X REQ PHY/QHP: CPT | Performed by: ANESTHESIOLOGY

## 2024-12-05 RX ORDER — MORPHINE SULFATE 15 MG/1
15 TABLET ORAL DAILY PRN
Qty: 20 TABLET | Refills: 0 | Status: SHIPPED | OUTPATIENT
Start: 2024-12-13 | End: 2025-01-02

## 2024-12-05 NOTE — PROGRESS NOTES
Refill sent.     [facility] reviewed and their refill has been sent. Please notify the patient that their refill has been sent in electronically.

## 2025-01-16 DIAGNOSIS — S90.31XA CONTUSION OF RIGHT FOOT, INITIAL ENCOUNTER: ICD-10-CM

## 2025-01-16 RX ORDER — MORPHINE SULFATE 15 MG/1
15 TABLET ORAL DAILY PRN
Qty: 20 TABLET | Refills: 0 | Status: SHIPPED | OUTPATIENT
Start: 2025-01-16 | End: 2025-02-05

## 2025-02-05 ENCOUNTER — OFFICE VISIT (OUTPATIENT)
Dept: PAIN MEDICINE | Facility: CLINIC | Age: 55
End: 2025-02-05
Payer: COMMERCIAL

## 2025-02-05 VITALS
BODY MASS INDEX: 28.6 KG/M2 | RESPIRATION RATE: 15 BRPM | HEART RATE: 88 BPM | TEMPERATURE: 99.1 F | WEIGHT: 230 LBS | OXYGEN SATURATION: 97 % | HEIGHT: 75 IN

## 2025-02-05 DIAGNOSIS — S90.01XA CONTUSION OF RIGHT ANKLE, INITIAL ENCOUNTER: ICD-10-CM

## 2025-02-05 DIAGNOSIS — G90.521 REFLEX SYMPATHETIC DYSTROPHY OF RIGHT LEG: Primary | ICD-10-CM

## 2025-02-05 DIAGNOSIS — S90.31XA CONTUSION OF RIGHT FOOT, INITIAL ENCOUNTER: ICD-10-CM

## 2025-02-05 PROCEDURE — 99214 OFFICE O/P EST MOD 30 MIN: CPT | Performed by: ANESTHESIOLOGY

## 2025-02-05 RX ORDER — MORPHINE SULFATE 15 MG/1
15 TABLET ORAL DAILY PRN
Qty: 20 TABLET | Refills: 0 | Status: SHIPPED | OUTPATIENT
Start: 2025-02-15 | End: 2025-03-07

## 2025-02-05 ASSESSMENT — ENCOUNTER SYMPTOMS
OCCASIONAL FEELINGS OF UNSTEADINESS: 0
DEPRESSION: 0
LOSS OF SENSATION IN FEET: 0

## 2025-02-05 ASSESSMENT — COLUMBIA-SUICIDE SEVERITY RATING SCALE - C-SSRS
1. IN THE PAST MONTH, HAVE YOU WISHED YOU WERE DEAD OR WISHED YOU COULD GO TO SLEEP AND NOT WAKE UP?: NO
6. HAVE YOU EVER DONE ANYTHING, STARTED TO DO ANYTHING, OR PREPARED TO DO ANYTHING TO END YOUR LIFE?: NO
2. HAVE YOU ACTUALLY HAD ANY THOUGHTS OF KILLING YOURSELF?: NO

## 2025-02-05 ASSESSMENT — PATIENT HEALTH QUESTIONNAIRE - PHQ9
SUM OF ALL RESPONSES TO PHQ9 QUESTIONS 1 AND 2: 0
1. LITTLE INTEREST OR PLEASURE IN DOING THINGS: NOT AT ALL
2. FEELING DOWN, DEPRESSED OR HOPELESS: NOT AT ALL

## 2025-02-05 ASSESSMENT — PAIN SCALES - GENERAL
PAINLEVEL_OUTOF10: 5 - MODERATE PAIN
PAINLEVEL_OUTOF10: 5

## 2025-02-05 ASSESSMENT — PAIN - FUNCTIONAL ASSESSMENT: PAIN_FUNCTIONAL_ASSESSMENT: 0-10

## 2025-02-05 ASSESSMENT — PAIN DESCRIPTION - DESCRIPTORS: DESCRIPTORS: ACHING;OTHER (COMMENT)

## 2025-02-05 NOTE — PROGRESS NOTES
History Of Present Illness  Benito Hare is a 54 y.o. male presenting with   Chief Complaint   Patient presents with    Follow-up     Patient follows up for his chronic right foot pain due to a work related [facility] injury.       He also reports pain into his LEFT knee continues to bother him and he has an appt with [facility].     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 The pt underwent over 12 lumbar sympathetic blocks to treat his right foot pain. The pain is constant, worse with activity and better with rest. The pain is an aching like sensation that can intensify with activity. Denies LE paresthesias, weakness, saddle anesthesia, bowel or bladder incontinence. To manage this pain the patient has attempted Lyrica 150mg BID. The patients chronic IDDM are stable.      Recall:   He had an episode of hypoglycemia on 9-1-17 and passed out while on the couch. He did not have any head or shoulder trauma. EMS came to the house and tried to remove his jacket and in the process he broke his left shoulder and now s/p shoulder surgery.      Pt reports right knee pain for several months. He ambulates with a noticeable limp due to CRPS in his right foot. I believe that he may have knee OA or ligament injury and will order an x-ray to confirm OA. I believe this injury is a direct result of his [facility] injury of CRPS in the right foot.      PAIN SCORE: 5/10 in his foot and 8/10 in his left knee     PSHx     Pt is s/p Right sided hernia repair on July 7th, 2021.   He is s/p nasal surgery on 5-6-2021 that was complicated by post operative bleeding on 5- and had a cauterization surgery on 5-. He reports his nose is sensitive to touch and cold / dry air. His ENT is Dr. Nabil Spencer     Past Medical History  He has a past medical history of Chronic pain syndrome (05/08/2017), Complex regional pain syndrome i of  unspecified lower limb (05/08/2017), Contusion of unspecified ankle, initial encounter, Contusion of unspecified foot, initial encounter (05/08/2017), Other long term (current) drug therapy (10/06/2016), Other specified health status, and Rotator cuff arthropathy, right.    Surgical History  He has a past surgical history that includes Other surgical history (05/05/2016); Other surgical history (09/28/2021); Other surgical history (09/28/2021); and Shoulder surgery (01/17/2018).     Social History  He reports that he has never smoked. He has never used smokeless tobacco. He reports current alcohol use. He reports that he does not use drugs.    Family History  No family history on file.     Allergies  Patient has no known allergies.    Review of Systems    All other systems reviewed and negative for any deficits. Pertinent positives and negatives were considered in the medical decision making process.        Physical Exam  Pulse 88   Temp 37.3 °C (99.1 °F)   Resp 15   Wt 104 kg (230 lb)   SpO2 97%     General: Pt appears stated age     Eyes: Conjunctiva non-icteric and lids without obvious rash or drooping. Pupils are symmetric     ENT: External ears and nose appear to be without deformity or rash. No lesions or masses noted. Hearing is grossly intact     Neck: No JVD noted, tracheal position midline.     Skin: Color change red and blue in his right foot with edema and changes in the nail beds.      Musculoskeletal: Gait is antalgic. Walks on his right heel      Digits/nails show no clubbing or cyanosis     Exam of muscles/joints/bones shows no gross atrophy and no abnormal/involuntary movements in the head/neckNo asymmetry or masses noted in the head/neck     Stability: no subluxation noted on movement of bilateral upper extremities or head/neck     Strength: 4/5 in B/L Lower extremities      Range of Motion: Decreased in right ankle and decreased in left shoulder      Sensation: In tact      Cranial nerves 2-12  are grossly intact     Psychiatric: Pt is alert and oriented to time, place and person.      Assessment/Plan   1. Reflex sympathetic dystrophy of right leg        2. Contusion of right foot, initial encounter  morphine (MSIR) 15 mg tablet      3. Contusion of right ankle, initial encounter               [facility]     S90.31XA  S90.01XA  G90.521        1. We once again did discuss the risks, benefits, and alternatives to chronic opioid therapy and that usage can be a danger to life. We also discussed proper and safe storage of medication to prevent unauthorized usage. The patient understands and will use the medicine responsibly. I will continue to wean his morphine and we discussed.      He is currently on Morphine 15mg once a day without any side effects.   Recall: his insurance company would not authorize an extended release form.      He brought in his previous months vial and his usage has been appropriate. He also reports he has been able to take 1/2 a tablet at times to help wit his nerve pain.      Pt provided a urine screen on 3-14-19 and it was appropriate for morphine and negative for any illicit drugs.      We did check an oral screen on 7-2-2020 and it was appropriate.      UDS on 6-9-2021 was appropriate and collected on 5-, 3-, 9-4-2024  CSA completed 3- and again on 12-, 6-5-2024  Lyrica contract on 11- and again on 12-  Oarrs checked on 11-, 5-, 12-, 9-  Narcan Prescribed in 2021, 9-  ORT completed on 11- score was 0.      Pt was also given a prescription for Narcan with his last prescription. We extensively discussed how to use this and what it is for as well as the side effects.      2. The patient is a candidate for a repeat right lumbar sympathetic block as needed. The patient understands the risks and will schedule if his condition flares up.      3. The pt was on Lyrica 150mg BID to treat nerve related pain.      He  reports feeling fuzzy with this medication and has since DISCONTINUED IT.         4. We again discussed the risks with having additional surgery on the extremities and how CRPS can spread to other extremities. The pt would need to have a nerve block prior to any extremity surgery procedure. The pt should also avoid IV's and blood pressure readings in the affected extremity.     5. I would recommend the pt continue on Cymbalta 60mg to help with nerve related pain. We discussed the risks, benefits, and side effects to this medication including the mechanism of action and the pt understands and agrees.     6.    Recall:  Pt reports right knee pain for several months. He ambulates with a noticeable limp due to CRPS in his right foot. I believe that he may have knee OA or ligament injury and will order an x-ray to confirm OA. I believe this injury is a direct result of his [facility] injury of CRPS in the right foot. He has a follow with Dr. Martínez an orthopedic surgeon at [facility] regarding his MRI findings.      7. The pt previously requested a renewal for his APEX shoe he obtained thru [facility]. I will contact our staff to assist in reordering. He reports the last time he had the shoe made was over 10 years ago. Our staff Harpreet in  was able to help him procure this.      f/u in 3 months      I spent time with the patient reviewing their imaging and discussing the risks benefits and alternatives to the above plan. A total of 30 minutes was spent reviewing the data and greater than 50% of that time was with the patient during the face to face encounter discussing treatment options both surgical, non-surgical, and minimally invasive techniques.     DATE OF PROCEDURE: 12-     PRE-PROCEDURE DIAGNOSIS: LEFT knee osteoarthritis     1. LEFT knee joint DURALANE          Giuliano Galindo MD    I spent time with the patient reviewing their imaging and discussing the risks benefits and alternatives to the above plan. A total  of 30 minutes was spent reviewing the data and greater than 50% of that time was with the patient during the face to face encounter discussing treatment options both surgical, non-surgical, and minimally invasive techniques.

## 2025-03-18 DIAGNOSIS — S90.31XA CONTUSION OF RIGHT FOOT, INITIAL ENCOUNTER: ICD-10-CM

## 2025-03-18 RX ORDER — MORPHINE SULFATE 15 MG/1
15 TABLET ORAL DAILY PRN
Qty: 20 TABLET | Refills: 0 | Status: SHIPPED | OUTPATIENT
Start: 2025-03-18 | End: 2025-04-07

## 2025-04-29 DIAGNOSIS — S90.31XA CONTUSION OF RIGHT FOOT, INITIAL ENCOUNTER: ICD-10-CM

## 2025-05-02 RX ORDER — MORPHINE SULFATE 15 MG/1
15 TABLET ORAL DAILY PRN
Qty: 20 TABLET | Refills: 0 | Status: SHIPPED | OUTPATIENT
Start: 2025-05-02 | End: 2025-05-22

## 2025-05-02 NOTE — TELEPHONE ENCOUNTER
[facility] reviewed and their refill has been sent. Please notify the patient that their refill has been sent in electronically.

## 2025-05-07 ENCOUNTER — OFFICE VISIT (OUTPATIENT)
Dept: PAIN MEDICINE | Facility: CLINIC | Age: 55
End: 2025-05-07
Payer: COMMERCIAL

## 2025-05-07 VITALS
BODY MASS INDEX: 28.6 KG/M2 | SYSTOLIC BLOOD PRESSURE: 140 MMHG | RESPIRATION RATE: 15 BRPM | HEART RATE: 98 BPM | HEIGHT: 75 IN | DIASTOLIC BLOOD PRESSURE: 80 MMHG | OXYGEN SATURATION: 97 % | WEIGHT: 230 LBS | TEMPERATURE: 99 F

## 2025-05-07 DIAGNOSIS — S90.31XA CONTUSION OF RIGHT FOOT, INITIAL ENCOUNTER: ICD-10-CM

## 2025-05-07 DIAGNOSIS — G90.521 COMPLEX REGIONAL PAIN SYNDROME TYPE 1 OF RIGHT LOWER EXTREMITY: Primary | ICD-10-CM

## 2025-05-07 DIAGNOSIS — S90.01XA CONTUSION OF RIGHT ANKLE, INITIAL ENCOUNTER: ICD-10-CM

## 2025-05-07 PROCEDURE — 99214 OFFICE O/P EST MOD 30 MIN: CPT | Performed by: ANESTHESIOLOGY

## 2025-05-07 ASSESSMENT — PAIN SCALES - GENERAL
PAINLEVEL_OUTOF10: 3
PAINLEVEL_OUTOF10: 3

## 2025-05-07 ASSESSMENT — PAIN DESCRIPTION - DESCRIPTORS: DESCRIPTORS: ACHING

## 2025-05-07 ASSESSMENT — PAIN - FUNCTIONAL ASSESSMENT: PAIN_FUNCTIONAL_ASSESSMENT: 0-10

## 2025-05-07 ASSESSMENT — ENCOUNTER SYMPTOMS
OCCASIONAL FEELINGS OF UNSTEADINESS: 0
LOSS OF SENSATION IN FEET: 0

## 2025-05-07 NOTE — PROGRESS NOTES
History Of Present Illness  Benito Hare is a 54 y.o. male presenting with   Chief Complaint   Patient presents with    Follow-up     Patient follows up for his chronic right foot pain due to a work related [facility] injury.       He also reports pain into his LEFT knee continues to bother him and he has an appt with [facility].     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 The pt underwent over 12 lumbar sympathetic blocks to treat his right foot pain. The pain is constant, worse with activity and better with rest. The pain is an aching like sensation that can intensify with activity. Denies LE paresthesias, weakness, saddle anesthesia, bowel or bladder incontinence. To manage this pain the patient has attempted Lyrica 150mg BID. The patients chronic IDDM are stable.      Recall:   He had an episode of hypoglycemia on 9-1-17 and passed out while on the couch. He did not have any head or shoulder trauma. EMS came to the house and tried to remove his jacket and in the process he broke his left shoulder and now s/p shoulder surgery.      Pt reports right knee pain for several months. He ambulates with a noticeable limp due to CRPS in his right foot. I believe that he may have knee OA or ligament injury and will order an x-ray to confirm OA. I believe this injury is a direct result of his [facility] injury of CRPS in the right foot.      PAIN SCORE: 5/10 in his foot and 8/10 in his left knee     PSHx     Pt is s/p Right sided hernia repair on July 7th, 2021.   He is s/p nasal surgery on 5-6-2021 that was complicated by post operative bleeding on 5- and had a cauterization surgery on 5-. He reports his nose is sensitive to touch and cold / dry air. His ENT is Dr. Nabil Spencer     Past Medical History  He has a past medical history of Chronic pain syndrome (05/08/2017), Complex regional pain syndrome i of  unspecified lower limb (05/08/2017), Contusion of unspecified ankle, initial encounter, Contusion of unspecified foot, initial encounter (05/08/2017), Other long term (current) drug therapy (10/06/2016), Other specified health status, and Rotator cuff arthropathy, right.    Surgical History  He has a past surgical history that includes Other surgical history (05/05/2016); Other surgical history (09/28/2021); Other surgical history (09/28/2021); and Shoulder surgery (01/17/2018).     Social History  He reports that he has never smoked. He has never used smokeless tobacco. He reports current alcohol use. He reports that he does not use drugs.    Family History  No family history on file.     Allergies  Patient has no known allergies.    Review of Systems    All other systems reviewed and negative for any deficits. Pertinent positives and negatives were considered in the medical decision making process.        Physical Exam  /80   Pulse 98   Temp 37.2 °C (99 °F)   Resp 15   Wt 104 kg (230 lb)   SpO2 97%     General: Pt appears stated age     Eyes: Conjunctiva non-icteric and lids without obvious rash or drooping. Pupils are symmetric     ENT: External ears and nose appear to be without deformity or rash. No lesions or masses noted. Hearing is grossly intact     Neck: No JVD noted, tracheal position midline.     Skin: Color change red and blue in his right foot with edema and changes in the nail beds.      Musculoskeletal: Gait is antalgic. Walks on his right heel      Digits/nails show no clubbing or cyanosis     Exam of muscles/joints/bones shows no gross atrophy and no abnormal/involuntary movements in the head/neckNo asymmetry or masses noted in the head/neck     Stability: no subluxation noted on movement of bilateral upper extremities or head/neck     Strength: 4/5 in B/L Lower extremities      Range of Motion: Decreased in right ankle and decreased in left shoulder      Sensation: In tact      Cranial  nerves 2-12 are grossly intact     Psychiatric: Pt is alert and oriented to time, place and person.      Assessment/Plan   1. Complex regional pain syndrome type 1 of right lower extremity        2. Contusion of right foot, initial encounter        3. Contusion of right ankle, initial encounter                 [facility]     S90.31XA  S90.01XA  G90.521        1. We once again did discuss the risks, benefits, and alternatives to chronic opioid therapy and that usage can be a danger to life. We also discussed proper and safe storage of medication to prevent unauthorized usage. The patient understands and will use the medicine responsibly. I will continue to wean his morphine and we discussed.      He is currently on Morphine 15mg once a day without any side effects.   Recall: his insurance company would not authorize an extended release form.      He brought in his previous months vial and his usage has been appropriate. He also reports he has been able to take 1/2 a tablet at times to help wit his nerve pain.      Pt provided a urine screen on 3-14-19 and it was appropriate for morphine and negative for any illicit drugs.      We did check an oral screen on 7-2-2020 and it was appropriate.      UDS on 6-9-2021 was appropriate and collected on 5-, 3-, 9-4-2024  CSA completed 3- and again on 12-, 6-5-2024  Lyrica contract on 11- and again on 12-  Oarrs checked on 11-, 5-, 12-, 9-  Narcan Prescribed in 2021, 9-  ORT completed on 11- score was 0.      Pt was also given a prescription for Narcan with his last prescription. We extensively discussed how to use this and what it is for as well as the side effects.      2. The patient is a candidate for a repeat right lumbar sympathetic block as needed. The patient understands the risks and will schedule if his condition flares up.      3. The pt was on Lyrica 150mg BID to treat nerve related pain.       He reports feeling fuzzy with this medication and has since DISCONTINUED IT.         4. We again discussed the risks with having additional surgery on the extremities and how CRPS can spread to other extremities. The pt would need to have a nerve block prior to any extremity surgery procedure. The pt should also avoid IV's and blood pressure readings in the affected extremity.     5. I would recommend the pt continue on Cymbalta 60mg to help with nerve related pain. We discussed the risks, benefits, and side effects to this medication including the mechanism of action and the pt understands and agrees.     6.    Recall:  Pt reports right knee pain for several months. He ambulates with a noticeable limp due to CRPS in his right foot. I believe that he may have knee OA or ligament injury and will order an x-ray to confirm OA. I believe this injury is a direct result of his [facility] injury of CRPS in the right foot. He has a follow with Dr. Martínez an orthopedic surgeon at [facility] regarding his MRI findings.      7. The pt previously requested a renewal for his [employer]PE[employer] shoe he obtained thru [facility]. I will contact our staff to assist in reordering. He reports the last time he had the shoe made was over 10 years ago. Our staff [place] in  was able to help him procure this.      f/u in 3 months      I spent time with the patient reviewing their imaging and discussing the risks benefits and alternatives to the above plan. A total of 30 minutes was spent reviewing the data and greater than 50% of that time was with the patient during the face to face encounter discussing treatment options both surgical, non-surgical, and minimally invasive techniques.     DATE OF PROCEDURE: 12-     PRE-PROCEDURE DIAGNOSIS: LEFT knee osteoarthritis     1. LEFT knee joint DURALANE          Giuliano Galindo MD    I spent time with the patient reviewing their imaging and discussing the risks benefits and alternatives to the above plan. A  total of 30 minutes was spent reviewing the data and greater than 50% of that time was with the patient during the face to face encounter discussing treatment options both surgical, non-surgical, and minimally invasive techniques.

## 2025-06-09 DIAGNOSIS — S90.31XA CONTUSION OF RIGHT FOOT, INITIAL ENCOUNTER: ICD-10-CM

## 2025-06-10 RX ORDER — MORPHINE SULFATE 15 MG/1
15 TABLET ORAL DAILY PRN
Qty: 20 TABLET | Refills: 0 | Status: SHIPPED | OUTPATIENT
Start: 2025-06-10 | End: 2025-06-30

## 2025-07-14 DIAGNOSIS — S90.31XA CONTUSION OF RIGHT FOOT, INITIAL ENCOUNTER: ICD-10-CM

## 2025-07-15 RX ORDER — MORPHINE SULFATE 15 MG/1
15 TABLET ORAL DAILY PRN
Qty: 20 TABLET | Refills: 0 | Status: SHIPPED | OUTPATIENT
Start: 2025-07-15 | End: 2025-08-04

## 2025-08-19 DIAGNOSIS — S90.31XA CONTUSION OF RIGHT FOOT, INITIAL ENCOUNTER: ICD-10-CM

## 2025-08-20 RX ORDER — MORPHINE SULFATE 15 MG/1
15 TABLET ORAL DAILY PRN
Qty: 20 TABLET | Refills: 0 | Status: SHIPPED | OUTPATIENT
Start: 2025-08-20 | End: 2025-09-09